# Patient Record
Sex: MALE | HISPANIC OR LATINO | URBAN - METROPOLITAN AREA
[De-identification: names, ages, dates, MRNs, and addresses within clinical notes are randomized per-mention and may not be internally consistent; named-entity substitution may affect disease eponyms.]

---
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
## 2024-06-18 ENCOUNTER — APPOINTMENT (RX ONLY)
Dept: URBAN - METROPOLITAN AREA CLINIC 41 | Facility: CLINIC | Age: 19
Setting detail: DERMATOLOGY
End: 2024-06-18

## 2024-06-18 DIAGNOSIS — Z41.9 ENCOUNTER FOR PROCEDURE FOR PURPOSES OTHER THAN REMEDYING HEALTH STATE, UNSPECIFIED: ICD-10-CM

## 2024-06-18 PROCEDURE — ? COSMETIC CONSULTATION: HYDRAFACIAL

## 2024-06-18 PROCEDURE — ? COSMETIC CONSULTATION: GENERAL

## 2024-06-18 PROCEDURE — ? OTHER (COSMETIC)

## 2024-06-18 NOTE — PROCEDURE: OTHER (COSMETIC)
Detail Level: Zone
Other (Free Text): Patient has cystic acne on his jawline. I counseled the patient that any facial we could do would not treat this acne and recommended he see his medical provider. I counseled the patient that the facial we discussed would address the whiteheads, comedones, and excess dead skin cell build up on the skin.